# Patient Record
Sex: MALE | Race: WHITE | NOT HISPANIC OR LATINO | Employment: FULL TIME | URBAN - NONMETROPOLITAN AREA
[De-identification: names, ages, dates, MRNs, and addresses within clinical notes are randomized per-mention and may not be internally consistent; named-entity substitution may affect disease eponyms.]

---

## 2022-07-05 RX ORDER — ALLOPURINOL 300 MG/1
TABLET ORAL
Qty: 90 TABLET | Refills: 1 | OUTPATIENT
Start: 2022-07-05

## 2022-07-26 ENCOUNTER — TELEPHONE (OUTPATIENT)
Dept: FAMILY MEDICINE CLINIC | Facility: CLINIC | Age: 42
End: 2022-07-26

## 2022-08-02 NOTE — TELEPHONE ENCOUNTER
Caller: Sami Paula    Relationship: Self    Best call back number: [phone]    Requested Prescriptions:     ALLOPURINOL 300 MG    ACYCLOVIR 500 MG    Pharmacy where request should be sent: [facility] - [place] - [address] AT PORTAL TO [facility] - [phone]  - [phone]      Additional details provided by patient: OUT OF ALLOPURINOL. 24 DAYS LEFT OF ACYCLOVIR.  PATIENT HAS AN APPOINTMENT WITH PCP ON 9/7/22. PATIENT STATES HE SPOKE WITH THE PHARMACY AND THEY HAVE NOT RECEIVED A REQUEST FOR THE PRESCRIPTIONS.     Does the patient have less than a 3 day supply:  [x] Yes  [] No    Sarah Henry Rep   08/02/22 15:37 EDT

## 2022-08-08 ENCOUNTER — TELEPHONE (OUTPATIENT)
Dept: FAMILY MEDICINE CLINIC | Facility: CLINIC | Age: 42
End: 2022-08-08

## 2022-08-08 RX ORDER — VALACYCLOVIR HYDROCHLORIDE 500 MG/1
500 TABLET, FILM COATED ORAL DAILY
Qty: 30 TABLET | Refills: 1 | Status: SHIPPED | OUTPATIENT
Start: 2022-08-08

## 2022-08-08 RX ORDER — ALLOPURINOL 300 MG/1
300 TABLET ORAL DAILY
COMMUNITY
End: 2022-08-08 | Stop reason: SDUPTHER

## 2022-08-08 RX ORDER — VALACYCLOVIR HYDROCHLORIDE 500 MG/1
1 TABLET, FILM COATED ORAL DAILY
COMMUNITY
Start: 2022-05-24 | End: 2022-08-08 | Stop reason: SDUPTHER

## 2022-08-08 RX ORDER — ALLOPURINOL 300 MG/1
300 TABLET ORAL DAILY
Qty: 30 TABLET | Refills: 0 | Status: SHIPPED | OUTPATIENT
Start: 2022-08-08 | End: 2022-08-08 | Stop reason: SDUPTHER

## 2022-08-08 RX ORDER — VALACYCLOVIR HYDROCHLORIDE 500 MG/1
500 TABLET, FILM COATED ORAL DAILY
Qty: 30 TABLET | Refills: 0 | Status: SHIPPED | OUTPATIENT
Start: 2022-08-08 | End: 2022-08-08 | Stop reason: SDUPTHER

## 2022-08-08 RX ORDER — ALLOPURINOL 300 MG/1
300 TABLET ORAL DAILY
Qty: 30 TABLET | Refills: 1 | Status: SHIPPED | OUTPATIENT
Start: 2022-08-08

## 2022-08-08 NOTE — TELEPHONE ENCOUNTER
MARIO PATIENT   Patient called and is VERY upset because his meds weren't sent in.  He is out and needs refill sent to [facility] until his appt in September. He said he will come down here and get handwritten script if not sent in today.   ALLOPURINOL 300 MG  ACYCLOVIR 500 MG

## 2022-08-08 NOTE — TELEPHONE ENCOUNTER
I sent enough refills on his medications till his appointment September 7th with us.    I sent his refills to the local pharmacy given he is currently out of his medication refills.

## 2024-10-07 ENCOUNTER — OFFICE VISIT (OUTPATIENT)
Dept: FAMILY MEDICINE CLINIC | Facility: CLINIC | Age: 44
End: 2024-10-07
Payer: COMMERCIAL

## 2024-10-07 VITALS
DIASTOLIC BLOOD PRESSURE: 92 MMHG | HEART RATE: 62 BPM | HEIGHT: 70 IN | SYSTOLIC BLOOD PRESSURE: 148 MMHG | OXYGEN SATURATION: 97 % | WEIGHT: 225.9 LBS | RESPIRATION RATE: 14 BRPM | BODY MASS INDEX: 32.34 KG/M2

## 2024-10-07 DIAGNOSIS — F17.210 CIGARETTE SMOKER: ICD-10-CM

## 2024-10-07 DIAGNOSIS — Z78.9 ALCOHOL USE: ICD-10-CM

## 2024-10-07 DIAGNOSIS — J32.9 RECURRENT SINUSITIS: ICD-10-CM

## 2024-10-07 DIAGNOSIS — Z13.1 SCREENING FOR DIABETES MELLITUS: ICD-10-CM

## 2024-10-07 DIAGNOSIS — M1A.0720 CHRONIC GOUT OF LEFT FOOT, UNSPECIFIED CAUSE: ICD-10-CM

## 2024-10-07 DIAGNOSIS — Z00.00 GENERAL MEDICAL EXAM: Primary | ICD-10-CM

## 2024-10-07 DIAGNOSIS — R03.0 ELEVATED BLOOD PRESSURE READING IN OFFICE WITHOUT DIAGNOSIS OF HYPERTENSION: ICD-10-CM

## 2024-10-07 DIAGNOSIS — B02.30 HERPES OCULAR: ICD-10-CM

## 2024-10-07 DIAGNOSIS — Z23 NEED FOR VACCINATION: ICD-10-CM

## 2024-10-07 DIAGNOSIS — Z13.29 SCREENING FOR THYROID DISORDER: ICD-10-CM

## 2024-10-07 DIAGNOSIS — Z11.59 NEED FOR HEPATITIS C SCREENING TEST: ICD-10-CM

## 2024-10-07 DIAGNOSIS — Z86.39 H/O ELEVATED LIPIDS: ICD-10-CM

## 2024-10-07 PROBLEM — F10.90 ALCOHOL USE: Status: ACTIVE | Noted: 2024-10-07

## 2024-10-07 PROCEDURE — 90471 IMMUNIZATION ADMIN: CPT | Performed by: PHYSICIAN ASSISTANT

## 2024-10-07 PROCEDURE — 90656 IIV3 VACC NO PRSV 0.5 ML IM: CPT | Performed by: PHYSICIAN ASSISTANT

## 2024-10-07 PROCEDURE — 99386 PREV VISIT NEW AGE 40-64: CPT | Performed by: PHYSICIAN ASSISTANT

## 2024-10-07 RX ORDER — VALACYCLOVIR HYDROCHLORIDE 500 MG/1
500 TABLET, FILM COATED ORAL DAILY
Qty: 90 TABLET | Refills: 1 | Status: SHIPPED | OUTPATIENT
Start: 2024-10-07

## 2024-10-07 RX ORDER — ALLOPURINOL 300 MG/1
300 TABLET ORAL DAILY
Qty: 90 TABLET | Refills: 1 | Status: SHIPPED | OUTPATIENT
Start: 2024-10-07

## 2024-10-08 LAB
ALBUMIN SERPL-MCNC: 4.9 G/DL (ref 4.1–5.1)
ALP SERPL-CCNC: 59 IU/L (ref 44–121)
ALT SERPL-CCNC: 30 IU/L (ref 0–44)
AST SERPL-CCNC: 25 IU/L (ref 0–40)
BASOPHILS # BLD AUTO: 0.1 X10E3/UL (ref 0–0.2)
BASOPHILS NFR BLD AUTO: 1 %
BILIRUB SERPL-MCNC: 0.7 MG/DL (ref 0–1.2)
BUN SERPL-MCNC: 12 MG/DL (ref 6–24)
BUN/CREAT SERPL: 13 (ref 9–20)
CALCIUM SERPL-MCNC: 10.1 MG/DL (ref 8.7–10.2)
CHLORIDE SERPL-SCNC: 101 MMOL/L (ref 96–106)
CHOLEST SERPL-MCNC: 196 MG/DL (ref 100–199)
CO2 SERPL-SCNC: 27 MMOL/L (ref 20–29)
CREAT SERPL-MCNC: 0.9 MG/DL (ref 0.76–1.27)
EGFRCR SERPLBLD CKD-EPI 2021: 108 ML/MIN/1.73
EOSINOPHIL # BLD AUTO: 0.2 X10E3/UL (ref 0–0.4)
EOSINOPHIL NFR BLD AUTO: 3 %
ERYTHROCYTE [DISTWIDTH] IN BLOOD BY AUTOMATED COUNT: 12.8 % (ref 11.6–15.4)
GLOBULIN SER CALC-MCNC: 2.5 G/DL (ref 1.5–4.5)
GLUCOSE SERPL-MCNC: 96 MG/DL (ref 70–99)
HBA1C MFR BLD: 5.2 % (ref 4.8–5.6)
HCT VFR BLD AUTO: 50.4 % (ref 37.5–51)
HCV AB SERPL QL IA: NORMAL
HCV IGG SERPL QL IA: NON REACTIVE
HDLC SERPL-MCNC: 43 MG/DL
HGB BLD-MCNC: 17.2 G/DL (ref 13–17.7)
IMM GRANULOCYTES # BLD AUTO: 0 X10E3/UL (ref 0–0.1)
IMM GRANULOCYTES NFR BLD AUTO: 1 %
LDLC SERPL CALC-MCNC: 127 MG/DL (ref 0–99)
LYMPHOCYTES # BLD AUTO: 2 X10E3/UL (ref 0.7–3.1)
LYMPHOCYTES NFR BLD AUTO: 31 %
MCH RBC QN AUTO: 32.8 PG (ref 26.6–33)
MCHC RBC AUTO-ENTMCNC: 34.1 G/DL (ref 31.5–35.7)
MCV RBC AUTO: 96 FL (ref 79–97)
MONOCYTES # BLD AUTO: 0.4 X10E3/UL (ref 0.1–0.9)
MONOCYTES NFR BLD AUTO: 7 %
NEUTROPHILS # BLD AUTO: 3.8 X10E3/UL (ref 1.4–7)
NEUTROPHILS NFR BLD AUTO: 57 %
PLATELET # BLD AUTO: 210 X10E3/UL (ref 150–450)
POTASSIUM SERPL-SCNC: 4.6 MMOL/L (ref 3.5–5.2)
PROT SERPL-MCNC: 7.4 G/DL (ref 6–8.5)
RBC # BLD AUTO: 5.25 X10E6/UL (ref 4.14–5.8)
SODIUM SERPL-SCNC: 142 MMOL/L (ref 134–144)
TRIGL SERPL-MCNC: 148 MG/DL (ref 0–149)
TSH SERPL DL<=0.005 MIU/L-ACNC: 1.72 UIU/ML (ref 0.45–4.5)
URATE SERPL-MCNC: 4.6 MG/DL (ref 3.8–8.4)
VLDLC SERPL CALC-MCNC: 26 MG/DL (ref 5–40)
WBC # BLD AUTO: 6.6 X10E3/UL (ref 3.4–10.8)

## 2024-10-08 NOTE — ASSESSMENT & PLAN NOTE
I think he likely has hypertension based on his levels, but we both prefer not to go by only today's readings.  I recommend that he invest in a blood pressure cuff and monitor levels.  We will reevaluate and 2 to 4 weeks.  He is also encouraged to reduce alcohol, smoking, caffeine, and sodium.

## 2024-10-08 NOTE — ASSESSMENT & PLAN NOTE
Over-the-counter allergy medications such as Allegra, Zyrtec, Claritin, or Xyzal encouraged for prevention ahead of seasons known to have flareups.  He expressed understanding.

## 2024-10-08 NOTE — ASSESSMENT & PLAN NOTE
He takes valacyclovir daily for prevention of HSV flare-ups in the eye. A refill for valacyclovir was provided.  Regular eye appointments also encouraged.

## 2024-10-08 NOTE — ASSESSMENT & PLAN NOTE
He reports that his gout has been well-controlled with no flare-ups in recent years. A refill for allopurinol was provided. His uric acid levels will be checked during the blood draw.  Encouraged to reduce alcohol to further prevent flareups.

## 2024-10-08 NOTE — ASSESSMENT & PLAN NOTE
He drinks 2-5 drinks per night, which he acknowledges has increased recently. He is encouraged to reduce alcohol intake to no more than two drinks per night to help manage blood pressure and overall health.

## 2024-10-18 ENCOUNTER — PATIENT ROUNDING (BHMG ONLY) (OUTPATIENT)
Dept: FAMILY MEDICINE CLINIC | Facility: CLINIC | Age: 44
End: 2024-10-18
Payer: COMMERCIAL

## 2024-10-18 NOTE — PROGRESS NOTES
October 18, 2024    Hello, may I speak with Sami Paula?    My name is Arminda      I am  with [facility]  [address] [place] [address] [address] [place] [address] [phone].    Before we get started may I verify your date of birth? 1980    I am calling to officially welcome you to our practice and ask about your recent visit. Is this a good time to talk? yes    Tell me about your visit with us. What things went well?  Visit went fine       We're always looking for ways to make our patients' experiences even better. Do you have recommendations on ways we may improve?  no    Overall were you satisfied with your first visit to our practice? yes       I appreciate you taking the time to speak with me today. Is there anything else I can do for you? no      Thank you, and have a great day.

## 2024-11-13 ENCOUNTER — OFFICE VISIT (OUTPATIENT)
Dept: FAMILY MEDICINE CLINIC | Facility: CLINIC | Age: 44
End: 2024-11-13
Payer: COMMERCIAL

## 2024-11-13 VITALS
SYSTOLIC BLOOD PRESSURE: 124 MMHG | BODY MASS INDEX: 31.78 KG/M2 | HEART RATE: 74 BPM | OXYGEN SATURATION: 98 % | DIASTOLIC BLOOD PRESSURE: 82 MMHG | WEIGHT: 227 LBS | HEIGHT: 71 IN

## 2024-11-13 DIAGNOSIS — R03.0 ELEVATED BLOOD PRESSURE READING IN OFFICE WITHOUT DIAGNOSIS OF HYPERTENSION: Primary | ICD-10-CM

## 2024-11-13 DIAGNOSIS — Z86.39 H/O ELEVATED LIPIDS: ICD-10-CM

## 2024-11-13 PROCEDURE — 99213 OFFICE O/P EST LOW 20 MIN: CPT | Performed by: PHYSICIAN ASSISTANT

## 2024-11-13 NOTE — ASSESSMENT & PLAN NOTE
His blood pressure today is better than the previous visit. He has not yet obtained a home blood pressure cuff. He reports no symptoms such as headaches or pressure. He is advised to monitor his blood pressure at home and continue making lifestyle changes.

## 2024-11-13 NOTE — PROGRESS NOTES
Office Note     Name: Sami Paula    : 1980     MRN: [id number]     Chief Complaint  Hypertension (Checking in on blood pressure)    Subjective   Patient or patient representative verbalized consent for the use of Ambient Listening during the visit with  Cristal Gallo PA-C for chart documentation. 2024  09:19 TRICIA Paula is a 44 y.o. male.     The patient presents for eval of elevated blood pressure.  He has not yet acquired a blood pressure monitor for home use. He reports no symptoms such as headaches or feelings of pressure.  His level has significantly improved today.    His weight has increased by 15 pounds over the past year, which he attributes to a lack of physical activity following a knee injury last summer. This injury required several months of physical therapy for recovery. He has made dietary changes, including reducing his egg consumption and opting for egg whites instead of whole eggs. He also incorporates high-fiber wraps into his diet. He believes that losing 15 to 20 pounds would significantly improve his health.    Review of Systems:   Review of Systems   All other systems reviewed and are negative.      Past Medical History:   Past Medical History:   Diagnosis Date    Gout        Past Surgical History:   Past Surgical History:   Procedure Laterality Date    SKIN GRAFT  2016       Family History: History reviewed. No pertinent family history.    Social History:   Social History     Socioeconomic History    Marital status:    Tobacco Use    Smoking status: Every Day     Current packs/day: 0.50     Average packs/day: 0.5 packs/day for 32.9 years (16.4 ttl pk-yrs)     Types: Cigarettes     Start date: 1992    Smokeless tobacco: Never   Vaping Use    Vaping status: Never Used   Substance and Sexual Activity    Alcohol use: Yes     Alcohol/week: 10.0 standard drinks of alcohol     Types: 2 Glasses of wine, 2 Cans of beer, 6 Shots of liquor per week      "Comment: 21 +    Drug use: Never    Sexual activity: Yes     Partners: Female       Immunizations:   Immunization History   Administered Date(s) Administered    COVID-19 (PFIZER) Purple Cap Monovalent 04/13/2021, 05/04/2021, 11/24/2021    DTaP 5 06/12/2016    Fluzone  >6mos 10/07/2024    Hepatitis A 04/09/2019, 06/25/2020    Hepatitis B Adolescent High Risk Infant 04/22/1998, 05/27/1998    Hepatitis B Adult/Adolescent IM 01/15/1999    Influenza, Unspecified 10/10/2018    Tdap 01/15/2016        Medications:     Current Outpatient Medications:     allopurinol (ZYLOPRIM) 300 MG tablet, Take 1 tablet by mouth Daily., Disp: 90 tablet, Rfl: 1    multivitamin with minerals (MULTIVITAMIN ADULT PO), Take 1 tablet by mouth Daily., Disp: , Rfl:     valACYclovir (VALTREX) 500 MG tablet, Take 1 tablet by mouth Daily., Disp: 90 tablet, Rfl: 1    Allergies:   No Known Allergies    Objective     Vital Signs  /82 (BP Location: Left arm, Patient Position: Sitting, Cuff Size: Large Adult)   Pulse 74   Ht 179.1 cm (70.5\")   Wt 103 kg (227 lb)   SpO2 98%   BMI 32.11 kg/m²   Estimated body mass index is 32.11 kg/m² as calculated from the following:    Height as of this encounter: 179.1 cm (70.5\").    Weight as of this encounter: 103 kg (227 lb).          Physical Exam  Vitals and nursing note reviewed.   Constitutional:       General: He is not in acute distress.     Appearance: Normal appearance. He is not ill-appearing.   HENT:      Head: Normocephalic and atraumatic.   Cardiovascular:      Rate and Rhythm: Normal rate and regular rhythm.      Pulses: Normal pulses.      Heart sounds: Normal heart sounds.   Pulmonary:      Effort: Pulmonary effort is normal. No respiratory distress.      Breath sounds: Normal breath sounds.   Musculoskeletal:      Right lower leg: No edema.      Left lower leg: No edema.   Skin:     General: Skin is warm and dry.   Neurological:      General: No focal deficit present.      Mental Status: He " is alert and oriented to person, place, and time.      Motor: No weakness.      Coordination: Coordination normal.   Psychiatric:         Mood and Affect: Mood normal.         Behavior: Behavior normal.          Results:  No results found for this or any previous visit (from the past 24 hours).   Recent Results (from the past 6 weeks)   Uric acid    Collection Time: 10/07/24  1:52 PM    Specimen: Blood    Blood  Release to yessica   Result Value Ref Range    Uric Acid 4.6 3.8 - 8.4 mg/dL   Comprehensive Metabolic Panel    Collection Time: 10/07/24  1:52 PM    Specimen: Blood    Blood  Release to yessica   Result Value Ref Range    Glucose 96 70 - 99 mg/dL    BUN 12 6 - 24 mg/dL    Creatinine 0.90 0.76 - 1.27 mg/dL    EGFR Result 108 >59 mL/min/1.73    BUN/Creatinine Ratio 13 9 - 20    Sodium 142 134 - 144 mmol/L    Potassium 4.6 3.5 - 5.2 mmol/L    Chloride 101 96 - 106 mmol/L    Total CO2 27 20 - 29 mmol/L    Calcium 10.1 8.7 - 10.2 mg/dL    Total Protein 7.4 6.0 - 8.5 g/dL    Albumin 4.9 4.1 - 5.1 g/dL    Globulin 2.5 1.5 - 4.5 g/dL    Total Bilirubin 0.7 0.0 - 1.2 mg/dL    Alkaline Phosphatase 59 44 - 121 IU/L    AST (SGOT) 25 0 - 40 IU/L    ALT (SGPT) 30 0 - 44 IU/L   Hemoglobin A1c    Collection Time: 10/07/24  1:52 PM    Specimen: Blood    Blood  Release to yessica   Result Value Ref Range    Hemoglobin A1C 5.2 4.8 - 5.6 %   Lipid Panel    Collection Time: 10/07/24  1:52 PM    Specimen: Blood    Blood  Release to yessica   Result Value Ref Range    Total Cholesterol 196 100 - 199 mg/dL    Triglycerides 148 0 - 149 mg/dL    HDL Cholesterol 43 >39 mg/dL    VLDL Cholesterol Blaise 26 5 - 40 mg/dL    LDL Chol Calc ([facility]) 127 (H) 0 - 99 mg/dL   Thyroid Cascade Profile    Collection Time: 10/07/24  1:52 PM    Specimen: Blood    Blood  Release to yessica   Result Value Ref Range    TSH 1.720 0.450 - 4.500 uIU/mL   HCV Antibody Rfx To Qnt PCR    Collection Time: 10/07/24  1:52 PM    Specimen: Blood    Blood  Release to yessica   Result  Value Ref Range    Hepatitis C Ab Non Reactive Non Reactive   CBC & Differential    Collection Time: 10/07/24  1:52 PM    Blood  Release to yessica   Result Value Ref Range    WBC 6.6 3.4 - 10.8 x10E3/uL    RBC 5.25 4.14 - 5.80 x10E6/uL    Hemoglobin 17.2 13.0 - 17.7 g/dL    Hematocrit 50.4 37.5 - 51.0 %    MCV 96 79 - 97 fL    MCH 32.8 26.6 - 33.0 pg    MCHC 34.1 31.5 - 35.7 g/dL    RDW 12.8 11.6 - 15.4 %    Platelets 210 150 - 450 x10E3/uL    Neutrophil Rel % 57 Not Estab. %    Lymphocyte Rel % 31 Not Estab. %    Monocyte Rel % 7 Not Estab. %    Eosinophil Rel % 3 Not Estab. %    Basophil Rel % 1 Not Estab. %    Neutrophils Absolute 3.8 1.4 - 7.0 x10E3/uL    Lymphocytes Absolute 2.0 0.7 - 3.1 x10E3/uL    Monocytes Absolute 0.4 0.1 - 0.9 x10E3/uL    Eosinophils Absolute 0.2 0.0 - 0.4 x10E3/uL    Basophils Absolute 0.1 0.0 - 0.2 x10E3/uL    Immature Granulocyte Rel % 1 Not Estab. %    Immature Grans Absolute 0.0 0.0 - 0.1 x10E3/uL   Hepatitis C Virus Interpretation    Collection Time: 10/07/24  1:52 PM    Blood  Release to yessica   Result Value Ref Range    Interpretation Comment           Assessment and Plan     Diagnoses and all orders for this visit:    1. Elevated blood pressure reading in office without diagnosis of hypertension (Primary)  Assessment & Plan:  His blood pressure today is better than the previous visit. He has not yet obtained a home blood pressure cuff. He reports no symptoms such as headaches or pressure. He is advised to monitor his blood pressure at home and continue making lifestyle changes.      2. H/O elevated lipids  Assessment & Plan:  His LDL cholesterol is in the high range. He has been advised to make dietary changes, such as reducing egg consumption and incorporating more egg whites. He is also encouraged to use high fiber wraps and to avoid fried foods. Increasing fiber intake and exercising regularly are recommended to help improve cholesterol levels.          Follow Up  Return in about  6 months (around 5/13/2025) for next scheduled follow up.    Cristal Gallo PA-C  [facility] Internal Medicine [facility]

## 2024-11-13 NOTE — ASSESSMENT & PLAN NOTE
His LDL cholesterol is in the high range. He has been advised to make dietary changes, such as reducing egg consumption and incorporating more egg whites. He is also encouraged to use high fiber wraps and to avoid fried foods. Increasing fiber intake and exercising regularly are recommended to help improve cholesterol levels.

## 2025-04-10 DIAGNOSIS — B02.30 HERPES OCULAR: ICD-10-CM

## 2025-04-10 DIAGNOSIS — M1A.0720 CHRONIC GOUT OF LEFT FOOT, UNSPECIFIED CAUSE: ICD-10-CM

## 2025-04-10 RX ORDER — VALACYCLOVIR HYDROCHLORIDE 500 MG/1
500 TABLET, FILM COATED ORAL DAILY
Qty: 90 TABLET | Refills: 1 | Status: SHIPPED | OUTPATIENT
Start: 2025-04-10

## 2025-04-10 RX ORDER — ALLOPURINOL 300 MG/1
300 TABLET ORAL DAILY
Qty: 90 TABLET | Refills: 1 | Status: SHIPPED | OUTPATIENT
Start: 2025-04-10

## 2025-04-10 NOTE — TELEPHONE ENCOUNTER
Caller: Sami Paula    Relationship: Self    Best call back number: [phone]    Requested Prescriptions:   Requested Prescriptions     Pending Prescriptions Disp Refills    valACYclovir (VALTREX) 500 MG tablet 90 tablet 1     Sig: Take 1 tablet by mouth Daily.    allopurinol (ZYLOPRIM) 300 MG tablet 90 tablet 1     Sig: Take 1 tablet by mouth Daily.        Pharmacy where request should be sent: [facility] - [place] - [facility] AT [address]AL TO [facility] - [phone]  - [phone] FX     Last office visit with prescribing clinician: 11/13/2024   Last telemedicine visit with prescribing clinician: Visit date not found   Next office visit with prescribing clinician: 5/13/2025     Additional details provided by patient:     Does the patient have less than a 3 day supply:  [] Yes  [x] No    Would you like a call back once the refill request has been completed: [] Yes [x] No    If the office needs to give you a call back, can they leave a voicemail: [] Yes [x] No    Sarah Dickens Rep   04/10/25 10:35 EDT

## 2025-05-13 ENCOUNTER — OFFICE VISIT (OUTPATIENT)
Dept: FAMILY MEDICINE CLINIC | Facility: CLINIC | Age: 45
End: 2025-05-13
Payer: COMMERCIAL

## 2025-05-13 VITALS
SYSTOLIC BLOOD PRESSURE: 138 MMHG | OXYGEN SATURATION: 96 % | WEIGHT: 245.1 LBS | DIASTOLIC BLOOD PRESSURE: 88 MMHG | HEIGHT: 71 IN | BODY MASS INDEX: 34.31 KG/M2 | HEART RATE: 76 BPM

## 2025-05-13 DIAGNOSIS — Z12.5 PROSTATE CANCER SCREENING: ICD-10-CM

## 2025-05-13 DIAGNOSIS — Z12.11 SCREENING FOR MALIGNANT NEOPLASM OF COLON: ICD-10-CM

## 2025-05-13 DIAGNOSIS — R03.0 ELEVATED BLOOD PRESSURE READING IN OFFICE WITHOUT DIAGNOSIS OF HYPERTENSION: Primary | ICD-10-CM

## 2025-05-13 DIAGNOSIS — Z86.39 H/O ELEVATED LIPIDS: ICD-10-CM

## 2025-05-13 DIAGNOSIS — M1A.0720 CHRONIC GOUT OF LEFT FOOT, UNSPECIFIED CAUSE: ICD-10-CM

## 2025-05-13 DIAGNOSIS — G89.29 CHRONIC RIGHT SHOULDER PAIN: ICD-10-CM

## 2025-05-13 DIAGNOSIS — M25.511 CHRONIC RIGHT SHOULDER PAIN: ICD-10-CM

## 2025-05-13 DIAGNOSIS — B02.30 HERPES OCULAR: ICD-10-CM

## 2025-05-13 PROCEDURE — 99214 OFFICE O/P EST MOD 30 MIN: CPT | Performed by: PHYSICIAN ASSISTANT

## 2025-05-13 RX ORDER — VALACYCLOVIR HYDROCHLORIDE 500 MG/1
500 TABLET, FILM COATED ORAL DAILY
Qty: 90 TABLET | Refills: 1 | Status: SHIPPED | OUTPATIENT
Start: 2025-05-13

## 2025-05-13 RX ORDER — ALLOPURINOL 300 MG/1
300 TABLET ORAL DAILY
Qty: 90 TABLET | Refills: 1 | Status: SHIPPED | OUTPATIENT
Start: 2025-05-13

## 2025-05-13 NOTE — PROGRESS NOTES
Office Note     Name: Sami Paula    : 1980     MRN: [id number]     Chief Complaint  Hypertension (Follow up), Gout (Follow up), and Back Pain (Back strain happened while sleeping last night)    Subjective   Patient or patient representative verbalized consent for the use of Ambient Listening during the visit with  Cristal Gallo PA-C for chart documentation. 2025  09:06 EDT      Sami Paula is a 45 y.o. male.     The patient presents for evaluation of elevated blood pressure, gout, and shoulder pain.    He has been monitoring his blood pressure, which has remained within normal limits. He has observed a slight weight increase and experiences intermittent significant pain when bending over, reminiscent of previous episodes requiring muscle relaxants and analgesics. No discomfort while seated today but acknowledges severe pain at times. He believes weight gain is the primary factor for elevated blood pressure and prefers dietary modifications and increased physical activity over antihypertensive medication.    No recent gout issues, occasionally mild symptoms. Adheres to medication regimen and avoids exacerbating foods. Received a refill last month.    Persistent anterior shoulder pain with a popping sensation, possibly related to a previous weightlifting injury. All joints exhibit popping and cracking sounds without associated pain or movement restrictions. Limited shoulder mobility prevents using a squat rack due to discomfort.    He complains of back pain this morning, but he states that he was fine last night.  He states that he thinks it is the way his lab more turned over in the bed last night.  He has recently taken some ibuprofen, so he hopes that it will help.  He has not had any recent injury, and he does not feel that he needs any additional medication or treatment at this time.    Review of Systems:   Review of Systems   Constitutional:  Negative for chills, diaphoresis,  fatigue and fever.   HENT:  Negative for congestion, sore throat and swollen glands.    Respiratory:  Negative for cough.    Cardiovascular:  Negative for chest pain.   Gastrointestinal:  Negative for abdominal pain, nausea and vomiting.   Genitourinary:  Negative for dysuria.   Musculoskeletal:  Positive for back pain and myalgias. Negative for neck pain.   Skin:  Negative for rash.   Neurological:  Negative for weakness and numbness.       Past Medical History:   Past Medical History:   Diagnosis Date    Gout        Past Surgical History:   Past Surgical History:   Procedure Laterality Date    SKIN GRAFT  2016       Family History: History reviewed. No pertinent family history.    Social History:   Social History     Socioeconomic History    Marital status:    Tobacco Use    Smoking status: Every Day     Current packs/day: 0.50     Average packs/day: 0.5 packs/day for 33.4 years (16.7 ttl pk-yrs)     Types: Cigarettes     Start date: 1/1/1992    Smokeless tobacco: Never   Vaping Use    Vaping status: Never Used   Substance and Sexual Activity    Alcohol use: Yes     Alcohol/week: 10.0 standard drinks of alcohol     Types: 2 Glasses of wine, 2 Cans of beer, 6 Shots of liquor per week     Comment: 21 +    Drug use: Never    Sexual activity: Yes     Partners: Female       Immunizations:   Immunization History   Administered Date(s) Administered    COVID-19 (PFIZER) Purple Cap Monovalent 04/13/2021, 05/04/2021, 11/24/2021    DTaP 5 06/12/2016    Fluzone  >6mos 10/07/2024    Hepatitis A 04/09/2019, 06/25/2020    Hepatitis B Adolescent High Risk Infant 04/22/1998, 05/27/1998    Hepatitis B Adult/Adolescent IM 01/15/1999    Influenza, Unspecified 10/10/2018    Tdap 01/15/2016        Medications:     Current Outpatient Medications:     allopurinol (ZYLOPRIM) 300 MG tablet, Take 1 tablet by mouth Daily., Disp: 90 tablet, Rfl: 1    multivitamin with minerals (MULTIVITAMIN ADULT PO), Take 1 tablet by mouth Daily.,  "Disp: , Rfl:     valACYclovir (VALTREX) 500 MG tablet, Take 1 tablet by mouth Daily., Disp: 90 tablet, Rfl: 1    Allergies:   No Known Allergies    Objective     Vital Signs  /88   Pulse 76   Ht 179.1 cm (70.5\")   Wt 111 kg (245 lb 1.6 oz)   SpO2 96%   BMI 34.67 kg/m²   Estimated body mass index is 34.67 kg/m² as calculated from the following:    Height as of this encounter: 179.1 cm (70.5\").    Weight as of this encounter: 111 kg (245 lb 1.6 oz).          Physical Exam  Vitals and nursing note reviewed.   Constitutional:       General: He is not in acute distress.     Appearance: Normal appearance. He is not ill-appearing.   HENT:      Head: Normocephalic and atraumatic.   Cardiovascular:      Rate and Rhythm: Normal rate and regular rhythm.      Pulses: Normal pulses.      Heart sounds: Normal heart sounds.   Pulmonary:      Effort: Pulmonary effort is normal. No respiratory distress.      Breath sounds: Normal breath sounds.   Musculoskeletal:      Right lower leg: No edema.      Left lower leg: No edema.   Skin:     General: Skin is warm and dry.   Neurological:      General: No focal deficit present.      Mental Status: He is alert and oriented to person, place, and time.      Motor: No weakness.      Coordination: Coordination normal.   Psychiatric:         Mood and Affect: Mood normal.         Behavior: Behavior normal.          Results:  No results found for this or any previous visit (from the past 24 hours).       Assessment and Plan       Diagnoses and all orders for this visit:    1. Elevated blood pressure reading in office without diagnosis of hypertension (Primary)  Assessment & Plan:  - Systolic borderline, diastolic significantly elevated.  - Recheck: 132/88.  - Advised home monitoring with arm cuff.  - Encouraged medication, but he prefers diet and exercise over medication; pharmacological intervention if lifestyle modifications fail.    Orders:  -     CBC & Differential; Future  -     " Comprehensive Metabolic Panel; Future  -     Comprehensive Metabolic Panel  -     CBC & Differential    2. Chronic gout of left foot, unspecified cause  Assessment & Plan:  He reports that his gout has been well-controlled with no flare-ups in recent years. Continues daily medication.  Advised to avoid aggravating foods.  A refill for allopurinol was provided. His uric acid levels will be checked during the blood draw.      Orders:  -     CBC & Differential; Future  -     Comprehensive Metabolic Panel; Future  -     Uric Acid; Future  -     allopurinol (ZYLOPRIM) 300 MG tablet; Take 1 tablet by mouth Daily.  Dispense: 90 tablet; Refill: 1  -     Uric Acid  -     Comprehensive Metabolic Panel  -     CBC & Differential    3. H/O elevated lipids  Assessment & Plan:  Cholesterol has been elevated in the past, so we will repeat on today's labs.    Orders:  -     CBC & Differential; Future  -     Comprehensive Metabolic Panel; Future  -     Lipid Panel; Future  -     Lipid Panel  -     Comprehensive Metabolic Panel  -     CBC & Differential    4. Herpes ocular  Assessment & Plan:  He takes valacyclovir daily for prevention of HSV flare-ups in the eye. A refill for valacyclovir was provided.  Regular eye appointments also encouraged.    Orders:  -     valACYclovir (VALTREX) 500 MG tablet; Take 1 tablet by mouth Daily.  Dispense: 90 tablet; Refill: 1    5. Chronic right shoulder pain  Assessment & Plan:  - Popping sensation, possibly old weightlifting injury.  - Pain does not affect movement but is bothersome.  - Declined x-ray; will monitor and report changes.      6. Prostate cancer screening  -     PSA Screen; Future  -     PSA Screen    7. Screening for malignant neoplasm of colon  Comments:  - Informed about colonoscopy screenings starting at age 45.  - Advised to consider scheduling and notify clinic when ready.      Follow Up  Return in about 6 months (around 11/13/2025) for Annual Physical.    Cristal Gallo  BRENNAN DELGADO  Internal Medicine [facility]

## 2025-05-13 NOTE — ASSESSMENT & PLAN NOTE
- Popping sensation, possibly old weightlifting injury.  - Pain does not affect movement but is bothersome.  - Declined x-ray; will monitor and report changes.

## 2025-05-13 NOTE — ASSESSMENT & PLAN NOTE
- Systolic borderline, diastolic significantly elevated.  - Recheck: 132/88.  - Advised home monitoring with arm cuff.  - Encouraged medication, but he prefers diet and exercise over medication; pharmacological intervention if lifestyle modifications fail.

## 2025-05-13 NOTE — ASSESSMENT & PLAN NOTE
He reports that his gout has been well-controlled with no flare-ups in recent years. Continues daily medication.  Advised to avoid aggravating foods.  A refill for allopurinol was provided. His uric acid levels will be checked during the blood draw.

## 2025-05-14 LAB
ALBUMIN SERPL-MCNC: 4.7 G/DL (ref 4.1–5.1)
ALP SERPL-CCNC: 61 IU/L (ref 44–121)
ALT SERPL-CCNC: 39 IU/L (ref 0–44)
AST SERPL-CCNC: 27 IU/L (ref 0–40)
BASOPHILS # BLD AUTO: 0.1 X10E3/UL (ref 0–0.2)
BASOPHILS NFR BLD AUTO: 1 %
BILIRUB SERPL-MCNC: 0.4 MG/DL (ref 0–1.2)
BUN SERPL-MCNC: 11 MG/DL (ref 6–24)
BUN/CREAT SERPL: 12 (ref 9–20)
CALCIUM SERPL-MCNC: 9.7 MG/DL (ref 8.7–10.2)
CHLORIDE SERPL-SCNC: 101 MMOL/L (ref 96–106)
CHOLEST SERPL-MCNC: 184 MG/DL (ref 100–199)
CO2 SERPL-SCNC: 23 MMOL/L (ref 20–29)
CREAT SERPL-MCNC: 0.9 MG/DL (ref 0.76–1.27)
EGFRCR SERPLBLD CKD-EPI 2021: 107 ML/MIN/1.73
EOSINOPHIL # BLD AUTO: 0.3 X10E3/UL (ref 0–0.4)
EOSINOPHIL NFR BLD AUTO: 4 %
ERYTHROCYTE [DISTWIDTH] IN BLOOD BY AUTOMATED COUNT: 12.7 % (ref 11.6–15.4)
GLOBULIN SER CALC-MCNC: 2.3 G/DL (ref 1.5–4.5)
GLUCOSE SERPL-MCNC: 94 MG/DL (ref 70–99)
HCT VFR BLD AUTO: 51.4 % (ref 37.5–51)
HDLC SERPL-MCNC: 37 MG/DL
HGB BLD-MCNC: 17.1 G/DL (ref 13–17.7)
IMM GRANULOCYTES # BLD AUTO: 0.1 X10E3/UL (ref 0–0.1)
IMM GRANULOCYTES NFR BLD AUTO: 1 %
LDLC SERPL CALC-MCNC: 123 MG/DL (ref 0–99)
LYMPHOCYTES # BLD AUTO: 2.1 X10E3/UL (ref 0.7–3.1)
LYMPHOCYTES NFR BLD AUTO: 31 %
MCH RBC QN AUTO: 32 PG (ref 26.6–33)
MCHC RBC AUTO-ENTMCNC: 33.3 G/DL (ref 31.5–35.7)
MCV RBC AUTO: 96 FL (ref 79–97)
MONOCYTES # BLD AUTO: 0.5 X10E3/UL (ref 0.1–0.9)
MONOCYTES NFR BLD AUTO: 7 %
NEUTROPHILS # BLD AUTO: 3.9 X10E3/UL (ref 1.4–7)
NEUTROPHILS NFR BLD AUTO: 56 %
PLATELET # BLD AUTO: 233 X10E3/UL (ref 150–450)
POTASSIUM SERPL-SCNC: 4.3 MMOL/L (ref 3.5–5.2)
PROT SERPL-MCNC: 7 G/DL (ref 6–8.5)
PSA SERPL-MCNC: 0.9 NG/ML (ref 0–4)
RBC # BLD AUTO: 5.35 X10E6/UL (ref 4.14–5.8)
SODIUM SERPL-SCNC: 141 MMOL/L (ref 134–144)
TRIGL SERPL-MCNC: 133 MG/DL (ref 0–149)
URATE SERPL-MCNC: 5 MG/DL (ref 3.8–8.4)
VLDLC SERPL CALC-MCNC: 24 MG/DL (ref 5–40)
WBC # BLD AUTO: 6.9 X10E3/UL (ref 3.4–10.8)

## 2025-05-18 ENCOUNTER — RESULTS FOLLOW-UP (OUTPATIENT)
Dept: FAMILY MEDICINE CLINIC | Facility: CLINIC | Age: 45
End: 2025-05-18
Payer: COMMERCIAL